# Patient Record
Sex: FEMALE | Race: WHITE | Employment: STUDENT | URBAN - METROPOLITAN AREA
[De-identification: names, ages, dates, MRNs, and addresses within clinical notes are randomized per-mention and may not be internally consistent; named-entity substitution may affect disease eponyms.]

---

## 2019-03-12 ENCOUNTER — OFFICE VISIT (OUTPATIENT)
Dept: ORTHOPEDICS | Facility: CLINIC | Age: 24
End: 2019-03-12
Payer: COMMERCIAL

## 2019-03-12 VITALS
SYSTOLIC BLOOD PRESSURE: 102 MMHG | HEIGHT: 71 IN | BODY MASS INDEX: 27.3 KG/M2 | WEIGHT: 195 LBS | DIASTOLIC BLOOD PRESSURE: 64 MMHG

## 2019-03-12 DIAGNOSIS — S06.0X0A CONCUSSION W/O COMA, INITIAL ENCOUNTER: Primary | ICD-10-CM

## 2019-03-12 PROCEDURE — 99204 OFFICE O/P NEW MOD 45 MIN: CPT | Performed by: FAMILY MEDICINE

## 2019-03-12 ASSESSMENT — MIFFLIN-ST. JEOR: SCORE: 1735.64

## 2019-03-12 NOTE — LETTER
[facility] [address] [address]  Phone: [phone]  Fax: [phone]    03/12/19    Tiffany Solano  [facility] [place] [address]      To whom it may concern:     Tiffany has a concussion. I recommend she work only half days for at least one week. She will be reassessed in 1-2 weeks.    Sincerely,      Kranthi Bello MD

## 2019-03-12 NOTE — PROGRESS NOTES
SUBJECTIVE:  Tiffany Solano is a 23 year old female who is seen as self referral for  evaluation of a possible concussion that occurred 19, 2  weeks ago.   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    Since your injury, have you continued with your normal cognitive activity (text, computer, school):  Has been going to work, she takes 10 minuetes breaks every 2 hours    Got blue blocking glasses yesterday.     Concussion Symptom Assessment      Headache or Pressure In Head: 4 - moderate to severe  Upset Stomach or Throwing Up: 0 - none  Problems with Balance: 0 - none  Feeling Dizzy: 2 - mild to moderate  Sensitivity to Light: 4 - moderate to severe  Sensitivity to Noise: 4 - moderate to severe  Mood Changes: 2 - mild to moderate  Feeling sluggish, hazy, or foggy: 4 - moderate to severe  Trouble Concentrating, Lack of Focus: 3 - moderate  Motion Sickness: 0 - none  Vision Changes: 0 - none  Memory Problems: 0 - none  Feeling Confused: 0 - none  Neck Pain: 4 - moderate to severe  Trouble Sleepin - none  Total Number of Symptoms: 8  Symptom Severity Score: 27      Sleep:  Sleeping a little more than usual      Past pertinent history: Migraines: no     Depression: no     Anxiety: no     Learning disability: no     ADHD: no     Past History of concussions: No      Patient's past medical, surgical, social and family histories reviewed:  reviewed on the up to date problem list in the chart      REVIEW OF SYSTEMS:  Skin: no bruising, no swelling  Musculoskeletal: as above  Neurologic: no numbness, paresthesias  Remainder of review of systems is negative including constitutional, CV,  "pulmonary, GI, except as noted in HPI or medical history.    OBJECTIVE:  /64   Ht 1.803 m (5' 11\")   Wt 88.5 kg (195 lb)   BMI 27.20 kg/m      EXAM:  General: healthy, alert and in no distress    Head: Normocephalic, atraumatic  Eyes: no scleral icterus or conjunctival erythema   Oropharynx:  Mucous membranes moist  Skin: no erythema, ecchymosis, petechiae, or jaundice  CV: regular rhythm by palpation, 2+ distal pulses, no pedal edema    Resp: normal respiratory effort without conversational dyspnea   Psych: normal mood and affect    Gait: Non-antalgic, appropriate coordination and balance   Neuro: normal light touch sensory exam of the extremities. Motor strength as noted below    HEENT:  Tympanic Membranes:Pearly  External Ear Canal:Normal  Oropharynx:Atraumatic  Reflexes: Normal  NECK: Full range of motion, mild soft tissue tenderness in the posterior lateral regions bilaterally and upper bilateral traps as well.  No midline tenderness.    NEUROLOGIC:  Cranial Nerves 2-12:  intact  RHINA:Yes  EOMI:Yes  Nystagmus: No  Coordination:  Finger to Nose: normal    Heel to Shin: normal    Rapid Alternating Movements: normal  Balance Testing: Romberg: normal   Backward Tandem: normal   Single-leg stance: normal  Advanced Balance Testing:     Single leg Balance with simultaneous cognitive test : normal    GAIT: Walk in hallway at normal speed: Able   Walk in hallway and turn head side to side when asked: Able with increase in symptoms headache and dizziness    Painful Eye movements: No  Convergence Testing: Abnormal   Visual Field Testing: normal        Vestibular/Ocular Motor Test:     Not Tested Headache Dizziness Nausea Fogginess Comments   Baseline N/A 6 0 0 0    Smooth Pursuits N/A 8 0 0 0    Saccades-Horizontal N/A 8 0 0 0    Saccades-Vertical N/A 8 0 0 0    Convergence (Near Point) N/A 8 0 0 0 (Near Point in CM)  Measure 1: 10  Measure 2: 12  Measure 3 13   VOR Vertical N/A 8 2 0 0    VOR Horizontal N/A 8 2 " 0 0    Visual Motion Sensitivity Test N/A 8 2 0 0            Cognitive:  Immediate object recall: 4/4  4 Object Recall at 5 minutes:4/4  Reverse months of the year:   Spell world backwards: Able  Backwards number strin numbers   4-9-3                  Alternate:  6-2-9   3-8-1-4   3-2-7-9    6-2-9-7-1   1-5-2-8-6    7-1-8-4-6-2   5-3-9-1-4-8       Impact Testing Scores: ImPACT Testing not performed    Strength:  Shoulder shrug (C5):5/5  Deltoid (C5): 5/5  Bicep (C6):5/5  Wrist Extension (C6): 5/5  Tricep (C7):5/5  Wrist Flexion (C7): 5/5  Finger Flexion (C8/T1):5/5      ASSESSMENT:  Concussion w/o coma, initial encounter    Primarily ocular symptoms, persistent 2 weeks out from initial injury.    immediate and delayed symptoms consistent with concussion.  There are no concerning neurologic/red flag findings on today's evaluation.    Discussed our current understanding of concussion, including etiology, prognosis, risk of re-injury, and possible complications, as well as typical management for this condition.    Counseled on importance of rest from physical and cognitive activities until asymptomatic, followed by graduated return to activity with close monitoring for recurrence of symptoms.  Discussed in depth what he should avoid, as well as worrisome signs, symptoms, and reasons to go to the ED.          PLAN:  Referral to physical therapy for vestibular ocular therapy.    Referral to chiropractic for concussion related neck pain and also acute low back pain from collision.    Absolute prohibition on returning to hockey until a symptomatic.  Strict avoidance of any activities raising risk for repeat head injury.  Discussed symptom minimization but okay for light activity and moderate work levels.  Will reduce work to half time for at least a week and possibly 2 depending on progression.  Advised to avoid screens as much as possible.    Recheck 1-2 weeks based on symptom progression.    Time spent in  one-on-one evaluation and discussion with patient regarding nature of problem, course, prior treatments, and therapeutic options, at least 50% of which was spent in counseling and coordination of care:  45 minutes.

## 2019-03-12 NOTE — LETTER
3/12/2019         RE: Tiffany Solano  [address]  [place] [address]        Dear Colleague,    Thank you for referring your patient, Tiffany Solano, to the [facility]. Please see a copy of my visit note below.      SUBJECTIVE:  Tiffany Solano is a 23 year old female who is seen as self referral for  evaluation of a possible concussion that occurred 19, 2  weeks ago.   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    Since your injury, have you continued with your normal cognitive activity (text, computer, school):  Has been going to work, she takes 10 minuetes breaks every 2 hours    Got blue blocking glasses yesterday.     Concussion Symptom Assessment      Headache or Pressure In Head: 4 - moderate to severe  Upset Stomach or Throwing Up: 0 - none  Problems with Balance: 0 - none  Feeling Dizzy: 2 - mild to moderate  Sensitivity to Light: 4 - moderate to severe  Sensitivity to Noise: 4 - moderate to severe  Mood Changes: 2 - mild to moderate  Feeling sluggish, hazy, or foggy: 4 - moderate to severe  Trouble Concentrating, Lack of Focus: 3 - moderate  Motion Sickness: 0 - none  Vision Changes: 0 - none  Memory Problems: 0 - none  Feeling Confused: 0 - none  Neck Pain: 4 - moderate to severe  Trouble Sleepin - none  Total Number of Symptoms: 8  Symptom Severity Score: 27      Sleep:  Sleeping a little more than usual      Past pertinent history: Migraines: no     Depression: no     Anxiety: no     Learning disability: no     ADHD: no     Past History of concussions: No      Patient's past medical, surgical, social and family histories  "reviewed:  reviewed on the up to date problem list in the chart      REVIEW OF SYSTEMS:  Skin: no bruising, no swelling  Musculoskeletal: as above  Neurologic: no numbness, paresthesias  Remainder of review of systems is negative including constitutional, CV, pulmonary, GI, except as noted in HPI or medical history.    OBJECTIVE:  /64   Ht 1.803 m (5' 11\")   Wt 88.5 kg (195 lb)   BMI 27.20 kg/m       EXAM:  General: healthy, alert and in no distress    Head: Normocephalic, atraumatic  Eyes: no scleral icterus or conjunctival erythema   Oropharynx:  Mucous membranes moist  Skin: no erythema, ecchymosis, petechiae, or jaundice  CV: regular rhythm by palpation, 2+ distal pulses, no pedal edema    Resp: normal respiratory effort without conversational dyspnea   Psych: normal mood and affect    Gait: Non-antalgic, appropriate coordination and balance   Neuro: normal light touch sensory exam of the extremities. Motor strength as noted below    HEENT:  Tympanic Membranes:Pearly  External Ear Canal:Normal  Oropharynx:Atraumatic  Reflexes: Normal  NECK: Full range of motion, mild soft tissue tenderness in the posterior lateral regions bilaterally and upper bilateral traps as well.  No midline tenderness.    NEUROLOGIC:  Cranial Nerves 2-12:  intact  RHINA:Yes  EOMI:Yes  Nystagmus: No  Coordination:  Finger to Nose: normal    Heel to Shin: normal    Rapid Alternating Movements: normal  Balance Testing: Romberg: normal   Backward Tandem: normal   Single-leg stance: normal  Advanced Balance Testing:     Single leg Balance with simultaneous cognitive test : normal    GAIT: Walk in hallway at normal speed: Able   Walk in hallway and turn head side to side when asked: Able with increase in symptoms headache and dizziness    Painful Eye movements: No  Convergence Testing: Abnormal   Visual Field Testing: normal        Vestibular/Ocular Motor Test:     Not Tested Headache Dizziness Nausea Fogginess Comments   Baseline N/A 6 " 0 0 0    Smooth Pursuits N/A 8 0 0 0    Saccades-Horizontal N/A 8 0 0 0    Saccades-Vertical N/A 8 0 0 0    Convergence (Near Point) N/A 8 0 0 0 (Near Point in CM)  Measure 1: 10  Measure 2: 12  Measure 3 13   VOR Vertical N/A 8 2 0 0    VOR Horizontal N/A 8 2 0 0    Visual Motion Sensitivity Test N/A 8 2 0 0            Cognitive:  Immediate object recall:   4 Object Recall at 5 minutes:  Reverse months of the year:   Spell world backwards: Able  Backwards number strin numbers   4-9-3                  Alternate:  6-2-9   3-8-1-4   3-2-7-9    6-2-9-7-1   1-5-2-8-6    7-1-8-4-6-2   5-3-9-1-4-8       Impact Testing Scores: ImPACT Testing not performed    Strength:  Shoulder shrug (C5):5/5  Deltoid (C5): 5/5  Bicep (C6):5/5  Wrist Extension (C6): 5/5  Tricep (C7):5/5  Wrist Flexion (C7): 5/5  Finger Flexion (C8/T1):5/5      ASSESSMENT:  Concussion w/o coma, initial encounter    Primarily ocular symptoms, persistent 2 weeks out from initial injury.    immediate and delayed symptoms consistent with concussion.  There are no concerning neurologic/red flag findings on today's evaluation.    Discussed our current understanding of concussion, including etiology, prognosis, risk of re-injury, and possible complications, as well as typical management for this condition.    Counseled on importance of rest from physical and cognitive activities until asymptomatic, followed by graduated return to activity with close monitoring for recurrence of symptoms.  Discussed in depth what he should avoid, as well as worrisome signs, symptoms, and reasons to go to the ED.          PLAN:  Referral to physical therapy for vestibular ocular therapy.    Referral to chiropractic for concussion related neck pain and also acute low back pain from collision.    Absolute prohibition on returning to hockey until a symptomatic.  Strict avoidance of any activities raising risk for repeat head injury.  Discussed symptom minimization but  okay for light activity and moderate work levels.  Will reduce work to half time for at least a week and possibly 2 depending on progression.  Advised to avoid screens as much as possible.    Recheck 1-2 weeks based on symptom progression.    Time spent in one-on-one evaluation and discussion with patient regarding nature of problem, course, prior treatments, and therapeutic options, at least 50% of which was spent in counseling and coordination of care:  45 minutes.    Again, thank you for allowing me to participate in the care of your patient.        Sincerely,        Kranthi Bello MD

## 2019-03-12 NOTE — Clinical Note
Still willing and interested in seeing cervicalgia in concussions? This young lady also has lbp from direct blow at the same time... details… detailed exam

## 2019-03-21 ENCOUNTER — HOSPITAL ENCOUNTER (OUTPATIENT)
Dept: PHYSICAL THERAPY | Facility: CLINIC | Age: 24
Setting detail: THERAPIES SERIES
End: 2019-03-21
Attending: FAMILY MEDICINE
Payer: COMMERCIAL

## 2019-03-21 PROCEDURE — 97112 NEUROMUSCULAR REEDUCATION: CPT | Mod: GP | Performed by: PHYSICAL THERAPIST

## 2019-03-21 PROCEDURE — 97162 PT EVAL MOD COMPLEX 30 MIN: CPT | Mod: GP | Performed by: PHYSICAL THERAPIST

## 2019-03-21 NOTE — PROGRESS NOTES
03/21/19 0800   Quick Adds   Type of Visit Initial OP PT Evaluation   General Information   Start of Care Date 03/21/19   Referring Physician Dr. Kranthi Bello   Orders Evaluate and Treat as Indicated   Order Date 03/12/19   Medical Diagnosis Concussion without coma   Onset of illness/injury or Date of Surgery 02/24/19   Surgical/Medical history reviewed Yes   Pertinent history of current problem (include personal factors and/or comorbidities that impact the POC) 23 y.o sustained a concussion on 2/24/19 hitting head against boards while playing hockey. Was wearing a helmet. HA hurts right when she wakes up and then right before bed. Less noticing HA throughout the day.  Still feels hazy. LIght sensitivity is improving. Currently work 1/2 days and symptoms are improving. Trying to stay off screens. Family hx of migraines. Did try short slow biking and was fine. Jumping jacks increased pain. At work takes 10 min breaks every 2 hours, Bought blue blocking glasses, CSA at 22 on 3/12/19. Being seen by chiro for neck and back pain.    Prior level of function comment Plays hockey 3x/week. Independent with all functional mobility. Works full time.   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    Patient/Family Goals Statement improve concussion symptoms.    Functional Scales   Functional Scales and Outcomes CSA 9   Pain   Pain comments Neck pain 5/10.  HA 5/10   Cognitive Status Examination   Orientation orientation to person, place and time   Level of Consciousness alert   Follows Commands and Answers Questions 100% of the time   Range of Motion (ROM)   [facility]   Strength   Strength Comments Referral to chiro to assess neck and back   Gait   Gait Comments No gross deficits with gait noted   Balance   Balance Comments TAndem stand able to do EO and EC  for 30 seconds, SLS EO x 30 seconds.  SLS EC 4-6 seconds   Oculomotor Exam   Smooth Pursuit Normal   Smooth Pursuit Comment c/o dizziness   Saccades Normal   Saccades Comments c/o dizziness   VOR Normal   VOR Comments c/o dizziness and HA   VOR Cancellation Abnormal   VOR Cancellation Comments difficulty staying on target, increased HA and dizziness   Convergence Testing Abnormal   Convergence Testing Comments 6 cm, 7cm, 7cm, 7 1/2 cm, 7 1/2 cm   Dynamic Visual Acuity (DVA)   Static Acuity (LogMar) 0.1   Horizontal Head Movement at 1 Hz (LogMar) 0.1   Horizontal Head Movement at 2 Hz (LogMar) 0.5   DVA Comments dizzy at slow and fast speeds but more pronounced at fast speeds.    Planned Therapy Interventions   Planned Therapy Interventions balance training;neuromuscular re-education   Clinical Impression   Criteria for Skilled Therapeutic Interventions Met yes, treatment indicated   PT Diagnosis Concussion, Dizziness,    Influenced by the following impairments dizziness, motion intolerance   Functional limitations due to impairments unable to work without breaks, unable to exercise at [employer] playing hockey,    Clinical Presentation Evolving/Changing   Clinical Presentation Rationale variable dizziness, CSA, DVA, abnormal convergence, HA and neck pain   Clinical Decision Making (Complexity) Moderate complexity   Therapy Frequency 1 time/week   Predicted Duration of Therapy Intervention (days/wks) 4 weeks   Risk & Benefits of therapy have been explained Yes   Patient, Family & other staff in agreement with plan of care Yes   GOALS   PT Eval Goals 1;2;3   Goal 1   Goal Identifier CSA   Goal Description Client will score a 0 on the CSA to indicate improvment in concussion symptoms.    Target Date 04/19/19   Goal 2   Goal Identifier DVA 2 Hz speed   Goal Description Client will score within 3 lines or less on DVA at 2 Hz speed to indicate improved symtpoms with quick head movements   Target Date 04/19/19   Goal 3   Goal  Identifier RTP protocol    Goal Description Client will be able to tolerate at least a level 3 RTP protocol without increase in symptoms.    Target Date 04/19/19   Total Evaluation Time   PT Eval, Moderate Complexity Minutes (91750) 30        03/21/19 0800   Quick Adds   Type of Visit Initial OP PT Evaluation   General Information   Start of Care Date 03/21/19   Referring Physician Dr. Kranthi Bello   Orders Evaluate and Treat as Indicated   Order Date 03/12/19   Medical Diagnosis Concussion without coma   Onset of illness/injury or Date of Surgery 02/24/19   Surgical/Medical history reviewed Yes   Pertinent history of current problem (include personal factors and/or comorbidities that impact the POC) 23 y.o sustained a concussion on 2/24/19 hitting head against boards while playing hockey. Was wearing a helmet. HA hurts right when she wakes up and then right before bed. Less noticing HA throughout the day.  Still feels hazy. LIght sensitivity is improving. Currently work 1/2 days and symptoms are improving. Trying to stay off screens. Family hx of migraines. Did try short slow biking and was fine. Jumping jacks increased pain. At work takes 10 min breaks every 2 hours, Bought blue blocking glasses, CSA at 22 on 3/12/19. Being seen by chiro for neck and back pain.    Prior level of function comment Plays hockey 3x/week. Independent with all functional mobility. Works full time.   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    Patient/Family Goals Statement improve concussion symptoms.    Functional Scales   Functional Scales and Outcomes CSA 9   Pain   Pain comments Neck pain 5/10.  HA 5/10   Cognitive Status Examination   Orientation orientation to person, place and time   Level of Consciousness alert   Follows Commands and Answers Questions 100% of the time   Range of  Motion (ROM)   ROM Comment [facility] for testing   Strength   Strength Comments Referral to chiro to assess neck and back   Gait   Gait Comments No gross deficits with gait noted   Balance   Balance Comments TAndem stand able to do EO and EC for 30 seconds, SLS EO x 30 seconds.  SLS EC 4-6 seconds   Oculomotor Exam   Smooth Pursuit Normal   Smooth Pursuit Comment c/o dizziness   Saccades Normal   Saccades Comments c/o dizziness   VOR Normal   VOR Comments c/o dizziness and HA   VOR Cancellation Abnormal   VOR Cancellation Comments difficulty staying on target, increased HA and dizziness   Convergence Testing Abnormal   Convergence Testing Comments 6 cm, 7cm, 7cm, 7 1/2 cm, 7 1/2 cm   Dynamic Visual Acuity (DVA)   Static Acuity (LogMar) 0.1   Horizontal Head Movement at 1 Hz (LogMar) 0.1   Horizontal Head Movement at 2 Hz (LogMar) 0.5   DVA Comments dizzy at slow and fast speeds but more pronounced at fast speeds.    Planned Therapy Interventions   Planned Therapy Interventions balance training;neuromuscular re-education   Clinical Impression   Criteria for Skilled Therapeutic Interventions Met yes, treatment indicated   PT Diagnosis Concussion, Dizziness,    Influenced by the following impairments dizziness, motion intolerance   Functional limitations due to impairments unable to work without breaks, unable to exercise at [employer] playing hockey,    Clinical Presentation Evolving/Changing   Clinical Presentation Rationale variable dizziness, CSA, DVA, abnormal convergence, HA and neck pain   Clinical Decision Making (Complexity) Moderate complexity   Therapy Frequency 1 time/week   Predicted Duration of Therapy Intervention (days/wks) 4 weeks   Risk & Benefits of therapy have been explained Yes   Patient, Family & other staff in agreement with plan of care Yes   GOALS   PT Eval Goals 1;2;3   Goal 1   Goal Identifier CSA   Goal Description Client will score a 0 on the CSA to indicate improvment in concussion symptoms.     Target Date 04/19/19   Goal 2   Goal Identifier DVA 2 Hz speed   Goal Description Client will score within 3 lines or less on DVA at 2 Hz speed to indicate improved symtpoms with quick head movements   Target Date 04/19/19   Goal 3   Goal Identifier RTP protocol    Goal Description Client will be able to tolerate at least a level 3 RTP protocol without increase in symptoms.    Target Date 04/19/19   Total Evaluation Time   PT Eval, Moderate Complexity Minutes (07308) 30

## 2019-03-27 ENCOUNTER — THERAPY VISIT (OUTPATIENT)
Dept: CHIROPRACTIC MEDICINE | Facility: CLINIC | Age: 24
End: 2019-03-27
Attending: FAMILY MEDICINE
Payer: COMMERCIAL

## 2019-03-27 DIAGNOSIS — M54.50 ACUTE RIGHT-SIDED LOW BACK PAIN WITHOUT SCIATICA: ICD-10-CM

## 2019-03-27 DIAGNOSIS — M54.2 CERVICALGIA: ICD-10-CM

## 2019-03-27 DIAGNOSIS — M99.01 CERVICAL SEGMENT DYSFUNCTION: Primary | ICD-10-CM

## 2019-03-27 DIAGNOSIS — M40.00 KYPHOSIS (ACQUIRED) (POSTURAL): ICD-10-CM

## 2019-03-27 DIAGNOSIS — S06.0X0D CONCUSSION WITHOUT LOSS OF CONSCIOUSNESS, SUBSEQUENT ENCOUNTER: ICD-10-CM

## 2019-03-27 DIAGNOSIS — M99.03 SOMATIC DYSFUNCTION OF LUMBAR REGION: ICD-10-CM

## 2019-03-27 DIAGNOSIS — M99.04 SOMATIC DYSFUNCTION OF SACRAL REGION: ICD-10-CM

## 2019-03-27 PROCEDURE — 99203 OFFICE O/P NEW LOW 30 MIN: CPT | Mod: 25 | Performed by: CHIROPRACTOR

## 2019-03-27 PROCEDURE — 98941 CHIROPRACT MANJ 3-4 REGIONS: CPT | Mod: AT | Performed by: CHIROPRACTOR

## 2019-03-31 PROBLEM — M54.50 ACUTE RIGHT-SIDED LOW BACK PAIN WITHOUT SCIATICA: Status: ACTIVE | Noted: 2019-03-31

## 2019-03-31 PROBLEM — M99.04 SOMATIC DYSFUNCTION OF SACRAL REGION: Status: ACTIVE | Noted: 2019-03-31

## 2019-03-31 PROBLEM — M54.2 CERVICALGIA: Status: ACTIVE | Noted: 2019-03-31

## 2019-03-31 PROBLEM — M99.03 SOMATIC DYSFUNCTION OF LUMBAR REGION: Status: ACTIVE | Noted: 2019-03-31

## 2019-03-31 PROBLEM — M40.00 KYPHOSIS (ACQUIRED) (POSTURAL): Status: ACTIVE | Noted: 2019-03-31

## 2019-03-31 PROBLEM — M99.01 CERVICAL SEGMENT DYSFUNCTION: Status: ACTIVE | Noted: 2019-03-31

## 2019-03-31 PROBLEM — S06.0X0D CONCUSSION WITHOUT LOSS OF CONSCIOUSNESS, SUBSEQUENT ENCOUNTER: Status: ACTIVE | Noted: 2019-03-31

## 2019-04-01 ENCOUNTER — HOSPITAL ENCOUNTER (OUTPATIENT)
Dept: PHYSICAL THERAPY | Facility: CLINIC | Age: 24
Setting detail: THERAPIES SERIES
End: 2019-04-01
Attending: FAMILY MEDICINE
Payer: COMMERCIAL

## 2019-04-01 PROCEDURE — 97112 NEUROMUSCULAR REEDUCATION: CPT | Mod: GP | Performed by: PHYSICAL THERAPIST

## 2019-04-01 NOTE — PROGRESS NOTES
Chiropractic Clinic Visit    PCP: [facility], [facility]    Tiffany Solano is a 23 year old female who is seen  in consultation at the request of  Kranthi Bello M.D. presenting with acute neck pain, upper back and HA sx. She also reports low back pain. Patient reports that the onset was February 24th 2019.  When asked, patient denies:, falling, slipping, bending and reaching or sleeping awkwardly. The pt reports an acute concussion from an injury playing hockey. She states she tripped into the boards hitting the back of her head in the boards. She was wearing a jim at the time. The pt did not lose consciousness. She grades the px a 6-8/10 on VAS. The pt is currently light sensitive, has constant HA and notes pain in the low back as well. She denies dizziness. The pt denies weakness in the extremities or other unusual sx.  Prior to onset, the patient was able to sit for 8 hours. Patient notes that due to symptoms, they can only sit for a few hours prior to pain. Tiffany Solano notes   sleeping rated at a 8/10 painful, difficult and prior to this incident it was 0/10.    Injury: Concussion from playing hockey      Location of Pain: right cervical and R low back  at the following level(s) C2 , C7 , T1 , T5 , L5 , Sacrum  and PSIS Right   Duration of Pain: February 24th/2019   Rating of Pain at worst: 8/10  Rating of Pain Currently: 6/10  Symptoms are better with: PT  Symptoms are worse with: sitting and sleeping, falling asleep  Additional Features: none      Other evaluation and/or treatments so far consists of: Sports medicine, PT    Health History  as reported by the patient:    How does the patient rate their own health:   Good    Current or past medical history:   Asthma    Medical allergies  None    Past Traumas/Surgeries  Other:  Bunionectomy     Family History  History reviewed. No pertinent family history.    Medications:  None    Occupation:       Primary job tasks:    Computer work and Prolonged standing    Barriers as home/work:   none    Additional health Issues:     none              Review of Systems  Musculoskeletal: as above  Remainder of review of systems is negative including constitutional, CV, pulmonary, GI, Skin and Neurologic except as noted in HPI or medical history.    History reviewed. No pertinent past medical history.  History reviewed. No pertinent surgical history.    Objective  There were no vitals taken for this visit.    GENERAL APPEARANCE: healthy, alert and no distress   GAIT: NORMAL  SKIN: no suspicious lesions or rashes  NEURO: Normal strength and tone, mentation intact and speech normal  PSYCH:  mentation appears normal and affect normal/bright      Tiffany was asked to complete the Neck Disability Index, the Oswestry Low Back Disability Index and Maria Esther Start Back screening tool, today in the office. NDI Disability score: 16%; pain severity scale: 8/10., The Oswestry Disability score: 12%. Keel Start Total Score:3 Sub Score: 1       Cervical Spine Exam    Range of Motion:         Full active and passive ROM forward flexion,  decreased extension, lateral rotation, lateral flexion with pain at end range     Inspection:         No visible deformity        normal lordotic curvature maintained  Anterior neck carriage, slumped seated posture, poor posture, increased kyphosis      Tender:        upper border of trapezius       B suboccipitals, B levator scapula     Non-Tender:        remainder of cervical spine area    Muscle strength:       C4 (shoulder shrug)  symmetric 5/5 Normal       C5 (shoulder abduction) symmetric 5/5 Normal       C6 (elbow flexion) symmetric 5/5 Normal       C7 (elbow extension) symmetric 5/5 Normal       C8 (finger abduction, thumb flexion) symmetric 5/5 Normal    Reflexes:        C5 (biceps) symmetric 2 bilaterally       C6 (supinator) symmetric 2 bilaterally       C7 (triceps) symmetric 2 bilaterally      Sensation:       grossly  "intact througout bilateral upper extremities    Special Tests:       positive (+) Spurling  Jad's- positive, VBI- negative and [place] - negative    Lymphatics:        no edema noted in the upper extremities       Lumbar exam:    Inspection:  \"     no visible deformity in the low back       normal skin\"  Slumped seated posture, anterior pelvic flexion with sacral/coccyx seated posture, Increased thoracic kyphosis with subsequent anterior cervical carriage. Poor seated posture      ROM:       full flexion       limited extension due to pain    Tender:       paraspinal muscles       B QL    Non Tender:       remainder of lumbar spine    Strength:       hip flexion 5/5 Normal       knee extension 5/5 Normal       ankle dorsiflexion 5/5 Normal       ankle plantarflexion 5/5 Normal       dorsiflexion of the great toe 5/5 Normal    Reflexes:       patellar (L3, L4) 2 bilaterally       achilles tendons (S1) 2 bilaterally        Sensation:      grossly intact throughout lower extremities    Special tests:  Kemps - Right positive, low back pain and Left negative, SLR - Right positive low back pain and Left negative, Gaenslen's - Right negative and Left negative and Fabere - Right positive, hip and low back pain and Left negative    Segmental spinal dysfunction/restrictions found at:  Occiput, C2 , C7 , T5 , T9 , L5 , Sacrum  and PSIS Left     The following soft tissue hypotonicities were observed:Quad lumb: bilateral, referred pain: no  Lev scapulae: ache and dull pain, referred pain: no  Sub-occiput: ache and dull pain, referred pain: no    Trigger points were found in:none     Muscle spasm found in:Lumbar erector spine, Quad lumb and Sub-occipital      Radiology:  None     Assessment:    1. Cervical segment dysfunction    2. Cervicalgia    3. Somatic dysfunction of lumbar region    4. Acute right-sided low back pain without sciatica    5. Somatic dysfunction of sacral region    6. Concussion without loss of " consciousness, subsequent encounter    7. Kyphosis (acquired) (postural)        RX ordered/plan of care  Anticipated outcomes  Possible risks and side effects    After discussing the risk and benefits of care, patient consented to treatment    Prognosis: Excellent      Patient's condition:  Patient had restrictions pre-manipulation    Treatment effectiveness:  Post manipulation there is better intersegmental movement and Patient claims to feel looser post manipulation      Plan:    Procedures:  Evaluation and Management  08083 Moderate level exam 30 min    CMT:  92619 Chiropractic manipulative treatment 3-4 regions performed   Cervical: Diversified and Activator, Occiput, C1 , C7 , Prone, Supine  Thoracic: Diversified, T1, T7, T9, Prone  Lumbar: Diversified, L5, Side posture  Pelvis: Drop Table, Sacrum , PSIS Right , Prone, Side posture    Modalities:  None performed this visit    Therapeutic procedures:  None    Response to Treatment  Reduction in symptoms as reported by patient    Treatment plan and goals:  Goals:  SLEEPING: the patient specific goal is to attain his pre-injury status of 8 hours comfortably-falling asleep  SITTING: the patient specific goal is to attain pre-injury status of  8 hours comfortably    Frequency of care  Duration of care is estimated to be 6-8 weeks, from the initial treatment.  It is estimated that the patient will need a total of 6-8 visits to resolve this episode.  For the initial therapeutic trial of care, the frequency is recommended at 1 X week, once daily.  A reevaluation would be clinically appropriate in 6-8 visits, to determine progress and further course of care.    In-Office Treatment  Evaluation  Spinal Chiropractic Manipulative Therapy  Acupuncture              Recommendations:    Instructions:  use lumbar support     Follow-up:    Return to care in 1 week with ACP  Postural correction.       Disclaimer: This note consists of symbols derived from keyboarding, dictation  and/or voice recognition software. As a result, there may be errors in the script that have gone undetected. Please consider this when interpreting information found in this chart.

## 2019-04-03 ENCOUNTER — THERAPY VISIT (OUTPATIENT)
Dept: CHIROPRACTIC MEDICINE | Facility: CLINIC | Age: 24
End: 2019-04-03
Payer: COMMERCIAL

## 2019-04-03 DIAGNOSIS — M99.03 SOMATIC DYSFUNCTION OF LUMBAR REGION: ICD-10-CM

## 2019-04-03 DIAGNOSIS — M54.50 ACUTE RIGHT-SIDED LOW BACK PAIN WITHOUT SCIATICA: ICD-10-CM

## 2019-04-03 DIAGNOSIS — S06.0X0D CONCUSSION WITHOUT LOSS OF CONSCIOUSNESS, SUBSEQUENT ENCOUNTER: ICD-10-CM

## 2019-04-03 DIAGNOSIS — M54.2 CERVICALGIA: ICD-10-CM

## 2019-04-03 DIAGNOSIS — M99.04 SOMATIC DYSFUNCTION OF SACRAL REGION: ICD-10-CM

## 2019-04-03 DIAGNOSIS — M99.01 CERVICAL SEGMENT DYSFUNCTION: Primary | ICD-10-CM

## 2019-04-03 PROCEDURE — 97035 APP MDLTY 1+ULTRASOUND EA 15: CPT | Performed by: CHIROPRACTOR

## 2019-04-03 PROCEDURE — 97112 NEUROMUSCULAR REEDUCATION: CPT | Mod: 59 | Performed by: CHIROPRACTOR

## 2019-04-03 PROCEDURE — 98941 CHIROPRACT MANJ 3-4 REGIONS: CPT | Mod: AT | Performed by: CHIROPRACTOR

## 2019-04-04 NOTE — PROGRESS NOTES
Visit #:  2    Subjective:  Tiffany Solano is a 23 year old female who is seen in f/u up for:        Cervical segment dysfunction  Cervicalgia  Somatic dysfunction of lumbar region  Acute right-sided low back pain without sciatica  Somatic dysfunction of sacral region  Concussion without loss of consciousness, subsequent encounter.     Since last visit on 3/27/2019,  Tiffany Solano reports:    Area of chief complaint:  Cervical, Thoracic and Lumbar :  Symptoms are graded at 4/10. The quality is described as stiff, achey, dull.  Motion has increased, but is still not normal. The pt reports 30% to 40% improvement since the initial presentation. She states HA sx have reduced and neck pain has decreased. She reports R sided neck pain and reduced ROM in the neck area. She notes R sided flank pain today. She denies weakness in the extremities or other unusual sx. Patient feels that they are improved due to a reduction in symptoms.     Since last visit the patient feels that they are 30-40% percent  improved from last visit.       Objective:  The following was observed:    P: palpatory tenderness Lumbar erector spine, Quad lumb and Sub-occipital R>>L  A: static palpation demonstrates intersegmental asymmetry   R: motion palpation notes restricted motion  T: hypertonicity at: Levator scapulae and Sub-occipital R>>L    Segmental spinal dysfunction/restrictions found at:  C2 , C7 , T5 , T9 , L5 , Sacrum  and PSIS Right       Assessment:    Diagnoses:      1. Cervical segment dysfunction    2. Cervicalgia    3. Somatic dysfunction of lumbar region    4. Acute right-sided low back pain without sciatica    5. Somatic dysfunction of sacral region    6. Concussion without loss of consciousness, subsequent encounter        Patient's condition:  Patient had restrictions pre-manipulation    Treatment effectiveness:  Post manipulation there is better intersegmental movement and Patient claims to feel looser post  manipulation      Procedures:  CMT:  59793 Chiropractic manipulative treatment 3-4 regions performed   OCC: R OCC Activator diversified  Thoracic: Diversified, T1, T5, T9, Prone  Lumbar: Diversified, L5, Side posture  Pelvis: Drop Table, Sacrum , PSIS Right , Prone    Modalities:  47324: US:  1.7 Torres/cm squared for 8  minutes at 1 mhz   Location: R levator scapula     Therapeutic procedures:  08022: Neurological re-education/proprioception training and proper long term sitting posture: Corrected patient's seated posture when sitting for longer than 20 minutes or seated at the computer related to work duties for over 8 hours per day. Fit patient with Cheri lumbar support for postural re-training with demonstration. Showed patient how to place the support correctly when seated and to increase usage by 2-3 hour increments per day until they are able to sit full time without spinal irritation. Related improper vs. proper sitting to optimal spinal biomechanics using the spine model with demonstration with the purpose of PREVENTING premature spinal degeneration from cumulative static motion. Demonstrated the increase in load and shearing forces on the spine in addition to the cumulative degenerative effects of axial compression on a spine that is chronically slumped and in an 'unlocked' position vs a 'locked' position. Demonstrated the use of a lap top table for lap top computers to prevent excessive cervical flexion of the neck. Demonstrated 'hip hinging' to access the computer when seated rather than thoracic flexion. Gave cervical retraction for proper cervical alignment, anterior deep cervical flexor strengthening and cervical proprioception training with demonstration. Per 10-12 minutes total        Response to Treatment  Reduction in symptoms as reported by patient    Prognosis: Excellent    Progress towards Goals: Patient is making progress towards the goal.  Goals:  SLEEPING: the patient specific goal is to  attain his pre-injury status of 8 hours comfortably-falling asleep  SITTING: the patient specific goal is to attain pre-injury status of  8 hours comfortably         Recommendations:    Instructions:  use lumbar support when seated     Follow-up:    Return to care in 1 week with US  ACP to follow .

## 2019-04-08 ENCOUNTER — HOSPITAL ENCOUNTER (OUTPATIENT)
Dept: PHYSICAL THERAPY | Facility: CLINIC | Age: 24
Setting detail: THERAPIES SERIES
End: 2019-04-08
Attending: FAMILY MEDICINE
Payer: COMMERCIAL

## 2019-04-08 PROCEDURE — 97112 NEUROMUSCULAR REEDUCATION: CPT | Mod: GP | Performed by: PHYSICAL THERAPIST

## 2019-04-12 ENCOUNTER — THERAPY VISIT (OUTPATIENT)
Dept: CHIROPRACTIC MEDICINE | Facility: CLINIC | Age: 24
End: 2019-04-12
Payer: COMMERCIAL

## 2019-04-12 DIAGNOSIS — M54.2 CERVICALGIA: ICD-10-CM

## 2019-04-12 DIAGNOSIS — S06.0X0D CONCUSSION WITHOUT LOSS OF CONSCIOUSNESS, SUBSEQUENT ENCOUNTER: ICD-10-CM

## 2019-04-12 DIAGNOSIS — M99.01 CERVICAL SEGMENT DYSFUNCTION: Primary | ICD-10-CM

## 2019-04-12 DIAGNOSIS — M99.04 SOMATIC DYSFUNCTION OF SACRAL REGION: ICD-10-CM

## 2019-04-12 DIAGNOSIS — M99.03 SOMATIC DYSFUNCTION OF LUMBAR REGION: ICD-10-CM

## 2019-04-12 DIAGNOSIS — M54.50 ACUTE RIGHT-SIDED LOW BACK PAIN WITHOUT SCIATICA: ICD-10-CM

## 2019-04-12 PROCEDURE — 98941 CHIROPRACT MANJ 3-4 REGIONS: CPT | Mod: AT | Performed by: CHIROPRACTOR

## 2019-04-12 PROCEDURE — 97035 APP MDLTY 1+ULTRASOUND EA 15: CPT | Performed by: CHIROPRACTOR

## 2019-04-12 NOTE — PROGRESS NOTES
Visit #:  3    Subjective:  Tiffany Solano is a 23 year old female who is seen in f/u up for:        Cervical segment dysfunction  Cervicalgia  Somatic dysfunction of lumbar region  Acute right-sided low back pain without sciatica  Somatic dysfunction of sacral region  Concussion without loss of consciousness, subsequent encounter.     Since last visit,  Tiffany Solano reports:    Area of chief complaint:  Cervical, Thoracic and Lumbar :  Symptoms are graded at 3/10. The quality is described as stiff, achey, dull.  Motion has increased, but is still not normal. The pt reports 60% improvement overall. She is pleased with her progress. She notes tension on the R side of the low back and flank area. She notes tension on the R side of the neck. She denies sx of dizziness. She will have a HA in the morning then after work. She is using the lumbar support with good results. The pt denies weakness or other unusual sx.     Since last visit the patient feels that they are 60% percent  improved from last visit.       Objective:  The following was observed:    P: palpatory tenderness Lumbar erector spine, Quad lumb and Sub-occipital R>>L  A: static palpation demonstrates intersegmental asymmetry   R: motion palpation notes restricted motion  T: hypertonicity at: Levator scapulae and Sub-occipital R>>L    Segmental spinal dysfunction/restrictions found at:  C2 , C7 , T5 , T9 , L5 , Sacrum  and PSIS Right       Assessment:    Diagnoses:      1. Cervical segment dysfunction    2. Cervicalgia    3. Somatic dysfunction of lumbar region    4. Acute right-sided low back pain without sciatica    5. Somatic dysfunction of sacral region    6. Concussion without loss of consciousness, subsequent encounter        Patient's condition:  Patient responding well to therapy    Treatment effectiveness:  Post manipulation there is better intersegmental movement and Patient claims to feel looser post manipulation      Procedures:  CMT:  45681  Chiropractic manipulative treatment 3-4 regions performed   OCC: R OCC Activator diversified  Thoracic: Diversified, T1, T5, T9, Prone  Lumbar: Diversified, L5, Side posture  Pelvis: Drop Table, Sacrum , PSIS Right , Prone    Modalities:  21206: US:  1.9 Torres/cm squared for 8  minutes at 1 mhz   Location: R levator scapula     Therapeutic procedures:  None     Response to Treatment  Reduction in symptoms as reported by patient    Prognosis: Excellent    Progress towards Goals: Patient is making progress towards the goal.  Goals:  SLEEPING: the patient specific goal is to attain his pre-injury status of 8 hours comfortably-falling asleep  SITTING: the patient specific goal is to attain pre-injury status of  8 hours comfortably         Recommendations:    Instructions:  use lumbar support when seated     Follow-up:    Return to care in 1 week with ACP

## 2019-04-15 ENCOUNTER — HOSPITAL ENCOUNTER (OUTPATIENT)
Dept: PHYSICAL THERAPY | Facility: CLINIC | Age: 24
Setting detail: THERAPIES SERIES
End: 2019-04-15
Attending: FAMILY MEDICINE
Payer: COMMERCIAL

## 2019-04-15 PROCEDURE — 97112 NEUROMUSCULAR REEDUCATION: CPT | Mod: GP | Performed by: PHYSICAL THERAPIST

## 2019-05-07 ENCOUNTER — HOSPITAL ENCOUNTER (OUTPATIENT)
Dept: PHYSICAL THERAPY | Facility: CLINIC | Age: 24
Setting detail: THERAPIES SERIES
End: 2019-05-07
Attending: FAMILY MEDICINE
Payer: COMMERCIAL

## 2019-05-07 PROCEDURE — 97110 THERAPEUTIC EXERCISES: CPT | Mod: GP | Performed by: PHYSICAL THERAPIST

## 2019-05-07 NOTE — DISCHARGE SUMMARY
Outpatient Physical Therapy Discharge Note     Patient: Tiffany Solano  : 1995    Beginning/End Dates of Reporting Period:  3/21/19 to 2019    Referring Provider: Dr. Kranthi Bello    Therapy Diagnosis: Concussion, Dizziness     Client Self Report: Tiffany states she is doing well, notices her main issue is waking up with head aches occassionally. Will be playing in first hockey game this weekend    Objective Measurements:  Objective Measure: CSA  Details: START 9, END 2     Objective Measure: DVA  Details: START 0.1 static, 0.1 1 Hz, 0.5 2 Hz. END 0.4 Hz    Goals:  Goal Identifier CSA   Goal Description Client will score a 0 on the CSA to indicate improvment in concussion symptoms.    Target Date 19   Date Met      Progress:     Goal Identifier DVA 2 Hz speed   Goal Description MET: Client will score within 3 lines or less on DVA at 2 Hz speed to indicate improved symtpoms with quick head movements   Target Date 19   Date Met   19   Progress:     Goal Identifier RTP protocol    Goal Description MET: Client will be able to tolerate at least a level 3 RTP protocol without increase in symptoms.    Target Date 19   Date Met   19   Progress:         Progress Toward Goals:   Progress this reporting period: Tiffany has met 2/3 of her long term goals. She currently has not met her goal of achieving a 0 on the CSA. Her only compliant is headaches, which she is experiencing more first thing in the morning, which is likely associated with neck pain. Therapist discussed altering sleeping position or adding pillows to address headaches, and will continue with going to a chiropractor for her neck. She is performing all exercises well and has tolerated higher intensity exercise, indicating ability to return to prior level of activity.    Plan:  Discharge from therapy.    Discharge:    Reason for Discharge: No further expectation of progress.    Equipment Issued: None    Discharge Plan:  Patient to continue home program.

## 2019-05-15 ENCOUNTER — THERAPY VISIT (OUTPATIENT)
Dept: CHIROPRACTIC MEDICINE | Facility: CLINIC | Age: 24
End: 2019-05-15
Payer: COMMERCIAL

## 2019-05-15 DIAGNOSIS — S06.0X0D CONCUSSION WITHOUT LOSS OF CONSCIOUSNESS, SUBSEQUENT ENCOUNTER: ICD-10-CM

## 2019-05-15 DIAGNOSIS — M99.03 SOMATIC DYSFUNCTION OF LUMBAR REGION: ICD-10-CM

## 2019-05-15 DIAGNOSIS — M54.50 ACUTE RIGHT-SIDED LOW BACK PAIN WITHOUT SCIATICA: ICD-10-CM

## 2019-05-15 DIAGNOSIS — M54.2 CERVICALGIA: ICD-10-CM

## 2019-05-15 DIAGNOSIS — M99.04 SOMATIC DYSFUNCTION OF SACRAL REGION: ICD-10-CM

## 2019-05-15 DIAGNOSIS — M99.01 CERVICAL SEGMENT DYSFUNCTION: Primary | ICD-10-CM

## 2019-05-15 PROCEDURE — 97810 ACUP 1/> WO ESTIM 1ST 15 MIN: CPT | Mod: GA | Performed by: CHIROPRACTOR

## 2019-05-15 PROCEDURE — 98940 CHIROPRACT MANJ 1-2 REGIONS: CPT | Mod: AT | Performed by: CHIROPRACTOR

## 2019-05-15 NOTE — PROGRESS NOTES
Visit #:  4    Subjective:  Tiffany Solano is a 23 year old female who is seen in f/u up for:        Cervical segment dysfunction  Cervicalgia  Somatic dysfunction of lumbar region  Acute right-sided low back pain without sciatica  Somatic dysfunction of sacral region  Concussion without loss of consciousness, subsequent encounter.     Since last visit,  Tiffany Solano reports:    Area of chief complaint:  Cervical, Thoracic and Lumbar :  Symptoms are graded at 3/10. The quality is described as stiff, achey, dull.  Motion has increased, but is still not normal. The pt reports 80% improvement. She experiences HA sx only in the morning. She was able to return to hockey. She notes an ache on the R side of the neck. She is pleased  With her progress. The pt continues with PT with good results.      Since last visit the patient feels that they are 80% percent  improved from last visit.       Objective:  The following was observed:    P: palpatory tenderness Lumbar erector spine, Quad lumb and Sub-occipital R>>L  A: static palpation demonstrates intersegmental asymmetry   R: motion palpation notes restricted motion  T: hypertonicity at: Levator scapulae and Sub-occipital R>>L    Segmental spinal dysfunction/restrictions found at:  C2 , C7 , T5 , T9 , L5 , Sacrum  and PSIS Right       Assessment:    Diagnoses:      1. Cervical segment dysfunction    2. Cervicalgia    3. Somatic dysfunction of lumbar region    4. Acute right-sided low back pain without sciatica    5. Somatic dysfunction of sacral region    6. Concussion without loss of consciousness, subsequent encounter        Patient's condition:  Patient responding well to therapy    Treatment effectiveness:  Post manipulation there is better intersegmental movement and Patient claims to feel looser post manipulation      Procedures:  CMT:  84887 Chiropractic manipulative treatment 3-4 regions performed   OCC: R OCC Activator diversified  Thoracic: Diversified, T1, T5,  T9, Prone  Lumbar: Diversified, L5, Side posture  Pelvis: Drop Table, Sacrum , PSIS Right , Prone    Modalities:  ACP: Nimisha points of the cervical spine and OCC, lauren points of the upper shoulders    Therapeutic procedures:  None     Response to Treatment  Reduction in symptoms as reported by patient    Prognosis: Excellent    Progress towards Goals: Patient is making progress towards the goal.  Goals:  SLEEPING: the patient specific goal is to attain his pre-injury status of 8 hours comfortably-falling asleep  SITTING: the patient specific goal is to attain pre-injury status of  8 hours comfortably         Recommendations:    Instructions:  use lumbar support when seated     Follow-up:    Return to care in 1-2 week with ACP

## 2019-05-29 ENCOUNTER — THERAPY VISIT (OUTPATIENT)
Dept: CHIROPRACTIC MEDICINE | Facility: CLINIC | Age: 24
End: 2019-05-29
Payer: COMMERCIAL

## 2019-05-29 DIAGNOSIS — M99.01 CERVICAL SEGMENT DYSFUNCTION: Primary | ICD-10-CM

## 2019-05-29 DIAGNOSIS — M54.2 CERVICALGIA: ICD-10-CM

## 2019-05-29 DIAGNOSIS — M99.04 SOMATIC DYSFUNCTION OF SACRAL REGION: ICD-10-CM

## 2019-05-29 DIAGNOSIS — M54.50 ACUTE RIGHT-SIDED LOW BACK PAIN WITHOUT SCIATICA: ICD-10-CM

## 2019-05-29 DIAGNOSIS — S06.0X0D CONCUSSION WITHOUT LOSS OF CONSCIOUSNESS, SUBSEQUENT ENCOUNTER: ICD-10-CM

## 2019-05-29 DIAGNOSIS — M99.03 SOMATIC DYSFUNCTION OF LUMBAR REGION: ICD-10-CM

## 2019-05-29 PROCEDURE — 98940 CHIROPRACT MANJ 1-2 REGIONS: CPT | Mod: AT | Performed by: CHIROPRACTOR

## 2019-05-29 PROCEDURE — 97810 ACUP 1/> WO ESTIM 1ST 15 MIN: CPT | Mod: GA | Performed by: CHIROPRACTOR

## 2019-05-29 NOTE — PROGRESS NOTES
Visit #:  5    Subjective:  iTffany Solano is a 23 year old female who is seen in f/u up for:        Cervical segment dysfunction  Cervicalgia  Somatic dysfunction of lumbar region  Acute right-sided low back pain without sciatica  Somatic dysfunction of sacral region  Concussion without loss of consciousness, subsequent encounter.     Since last visit,  Tiffany Solano reports:    Area of chief complaint:  Cervical, Thoracic and Lumbar :  Symptoms are graded at . The quality is described as stiff, achey, dull.  Motion has increased, but is still not normal. The pt reports 90% improvement. She experiences HA sx once in the morning 1-2 times per week. She denies dizziness sx. She has been cleared from the concussion. The pt denies weakness or other unusual sx.     Since last visit the patient feels that they are 90% percent  improved from last visit.       Objective:  The following was observed:    P: palpatory tenderness Lumbar erector spine, Quad lumb and Sub-occipital R>>L  A: static palpation demonstrates intersegmental asymmetry   R: motion palpation notes restricted motion  T: hypertonicity at: Levator scapulae and Sub-occipital R>>L    Segmental spinal dysfunction/restrictions found at:  C2 , C7 , T5 , T9 , L5 , Sacrum  and PSIS Right       Assessment:    Diagnoses:      1. Cervical segment dysfunction    2. Cervicalgia    3. Somatic dysfunction of lumbar region    4. Acute right-sided low back pain without sciatica    5. Somatic dysfunction of sacral region    6. Concussion without loss of consciousness, subsequent encounter        Patient's condition:  Patient responding well to therapy    Treatment effectiveness:  Post manipulation there is better intersegmental movement and Patient claims to feel looser post manipulation      Procedures:  CMT:  17327 Chiropractic manipulative treatment 3-4 regions performed   OCC: R OCC Activator diversified  Thoracic: Diversified, T1, T5, T9, Prone  Lumbar:  Diversified, L5, Side posture  Pelvis: Drop Table, Sacrum , PSIS Right , Prone    Modalities:  ACP: [place] points of the cervical spine and OCC, lauren points of the upper shoulders    Therapeutic procedures:  None     Response to Treatment  Reduction in symptoms as reported by patient    Prognosis: Excellent    Progress towards Goals: Patient is making progress towards the goal.  Goals:  SLEEPING: the patient specific goal is to attain his pre-injury status of 8 hours comfortably-falling asleep  SITTING: the patient specific goal is to attain pre-injury status of  8 hours comfortably         Recommendations:    Instructions:  use lumbar support when seated     Follow-up:    Return to care in 3-4  week with ACP

## 2019-11-25 ENCOUNTER — APPOINTMENT (OUTPATIENT)
Age: 24
Setting detail: DERMATOLOGY
End: 2019-11-25

## 2019-11-25 DIAGNOSIS — L21.8 OTHER SEBORRHEIC DERMATITIS: ICD-10-CM

## 2019-11-25 DIAGNOSIS — D18.0 HEMANGIOMA: ICD-10-CM

## 2019-11-25 DIAGNOSIS — D22 MELANOCYTIC NEVI: ICD-10-CM

## 2019-11-25 DIAGNOSIS — L70.0 ACNE VULGARIS: ICD-10-CM

## 2019-11-25 PROBLEM — D23.72 OTHER BENIGN NEOPLASM OF SKIN OF LEFT LOWER LIMB, INCLUDING HIP: Status: ACTIVE | Noted: 2019-11-25

## 2019-11-25 PROBLEM — D18.01 HEMANGIOMA OF SKIN AND SUBCUTANEOUS TISSUE: Status: ACTIVE | Noted: 2019-11-25

## 2019-11-25 PROBLEM — D22.39 MELANOCYTIC NEVI OF OTHER PARTS OF FACE: Status: ACTIVE | Noted: 2019-11-25

## 2019-11-25 PROCEDURE — OTHER PRESCRIPTION: OTHER

## 2019-11-25 PROCEDURE — 99203 OFFICE O/P NEW LOW 30 MIN: CPT

## 2019-11-25 PROCEDURE — OTHER COUNSELING: OTHER

## 2019-11-25 RX ORDER — KETOCONAZOLE 20.5 MG/ML
SHAMPOO, SUSPENSION TOPICAL
Qty: 1 | Refills: 2 | Status: ERX | COMMUNITY
Start: 2019-11-25

## 2019-11-25 ASSESSMENT — LOCATION ZONE DERM
LOCATION ZONE: TRUNK
LOCATION ZONE: SCALP
LOCATION ZONE: FACE

## 2019-11-25 ASSESSMENT — LOCATION DETAILED DESCRIPTION DERM
LOCATION DETAILED: LEFT MEDIAL FRONTAL SCALP
LOCATION DETAILED: UPPER STERNUM
LOCATION DETAILED: LEFT INFERIOR CENTRAL MALAR CHEEK
LOCATION DETAILED: RIGHT MEDIAL FOREHEAD

## 2019-11-25 ASSESSMENT — LOCATION SIMPLE DESCRIPTION DERM
LOCATION SIMPLE: RIGHT FOREHEAD
LOCATION SIMPLE: LEFT SCALP
LOCATION SIMPLE: CHEST
LOCATION SIMPLE: LEFT CHEEK

## 2019-11-25 NOTE — PROCEDURE: COUNSELING
Erythromycin Counseling:  I discussed with the patient the risks of erythromycin including but not limited to GI upset, allergic reaction, drug rash, diarrhea, increase in liver enzymes, and yeast infections.
Azithromycin Counseling:  I discussed with the patient the risks of azithromycin including but not limited to GI upset, allergic reaction, drug rash, diarrhea, and yeast infections.
Doxycycline Counseling:  Patient counseled regarding possible photosensitivity and increased risk for sunburn.  Patient instructed to avoid sunlight, if possible.  When exposed to sunlight, patients should wear protective clothing, sunglasses, and sunscreen.  The patient was instructed to call the office immediately if the following severe adverse effects occur:  hearing changes, easy bruising/bleeding, severe headache, or vision changes.  The patient verbalized understanding of the proper use and possible adverse effects of doxycycline.  All of the patient's questions and concerns were addressed.
Detail Level: Zone
Tetracycline Pregnancy And Lactation Text: This medication is Pregnancy Category D and not consider safe during pregnancy. It is also excreted in breast milk.
Spironolactone Counseling: Patient advised regarding risks of diarrhea, abdominal pain, hyperkalemia, birth defects (for female patients), liver toxicity and renal toxicity. The patient may need blood work to monitor liver and kidney function and potassium levels while on therapy. The patient verbalized understanding of the proper use and possible adverse effects of spironolactone.  All of the patient's questions and concerns were addressed.
Bactrim Pregnancy And Lactation Text: This medication is Pregnancy Category D and is known to cause fetal risk.  It is also excreted in breast milk.
Birth Control Pills Counseling: Birth Control Pill Counseling: I discussed with the patient the potential side effects of OCPs including but not limited to increased risk of stroke, heart attack, thrombophlebitis, deep venous thrombosis, hepatic adenomas, breast changes, GI upset, headaches, and depression.  The patient verbalized understanding of the proper use and possible adverse effects of OCPs. All of the patient's questions and concerns were addressed.
Tetracycline Counseling: Patient counseled regarding possible photosensitivity and increased risk for sunburn.  Patient instructed to avoid sunlight, if possible.  When exposed to sunlight, patients should wear protective clothing, sunglasses, and sunscreen.  The patient was instructed to call the office immediately if the following severe adverse effects occur:  hearing changes, easy bruising/bleeding, severe headache, or vision changes.  The patient verbalized understanding of the proper use and possible adverse effects of tetracycline.  All of the patient's questions and concerns were addressed. Patient understands to avoid pregnancy while on therapy due to potential birth defects.
Topical Retinoid counseling:  Patient advised to apply a pea-sized amount only at bedtime and wait 30 minutes after washing their face before applying.  If too drying, patient may add a non-comedogenic moisturizer. The patient verbalized understanding of the proper use and possible adverse effects of retinoids.  All of the patient's questions and concerns were addressed.
High Dose Vitamin A Pregnancy And Lactation Text: High dose vitamin A therapy is contraindicated during pregnancy and breast feeding.
Detail Level: Simple
Isotretinoin Counseling: Patient should get monthly blood tests, not donate blood, not drive at night if vision affected, not share medication, and not undergo elective surgery for 6 months after tx completed. Side effects reviewed, pt to contact office should one occur.
Benzoyl Peroxide Pregnancy And Lactation Text: This medication is Pregnancy Category C. It is unknown if benzoyl peroxide is excreted in breast milk.
High Dose Vitamin A Counseling: Side effects reviewed, pt to contact office should one occur.
Topical Clindamycin Pregnancy And Lactation Text: This medication is Pregnancy Category B and is considered safe during pregnancy. It is unknown if it is excreted in breast milk.
Tazorac Pregnancy And Lactation Text: This medication is not safe during pregnancy. It is unknown if this medication is excreted in breast milk.
Use Enhanced Medication Counseling?: No
Topical Sulfur Applications Pregnancy And Lactation Text: This medication is Pregnancy Category C and has an unknown safety profile during pregnancy. It is unknown if this topical medication is excreted in breast milk.
Isotretinoin Pregnancy And Lactation Text: This medication is Pregnancy Category X and is considered extremely dangerous during pregnancy. It is unknown if it is excreted in breast milk.
Topical Sulfur Applications Counseling: Topical Sulfur Counseling: Patient counseled that this medication may cause skin irritation or allergic reactions.  In the event of skin irritation, the patient was advised to reduce the amount of the drug applied or use it less frequently.   The patient verbalized understanding of the proper use and possible adverse effects of topical sulfur application.  All of the patient's questions and concerns were addressed.
Topical Retinoid Pregnancy And Lactation Text: This medication is Pregnancy Category C. It is unknown if this medication is excreted in breast milk.
Dapsone Pregnancy And Lactation Text: This medication is Pregnancy Category C and is not considered safe during pregnancy or breast feeding.
Topical Clindamycin Counseling: Patient counseled that this medication may cause skin irritation or allergic reactions.  In the event of skin irritation, the patient was advised to reduce the amount of the drug applied or use it less frequently.   The patient verbalized understanding of the proper use and possible adverse effects of clindamycin.  All of the patient's questions and concerns were addressed.
Doxycycline Pregnancy And Lactation Text: This medication is Pregnancy Category D and not consider safe during pregnancy. It is also excreted in breast milk but is considered safe for shorter treatment courses.
Azithromycin Pregnancy And Lactation Text: This medication is considered safe during pregnancy and is also secreted in breast milk.
Birth Control Pills Pregnancy And Lactation Text: This medication should be avoided if pregnant and for the first 30 days post-partum.
Benzoyl Peroxide Counseling: Patient counseled that medicine may cause skin irritation and bleach clothing.  In the event of skin irritation, the patient was advised to reduce the amount of the drug applied or use it less frequently.   The patient verbalized understanding of the proper use and possible adverse effects of benzoyl peroxide.  All of the patient's questions and concerns were addressed.
Spironolactone Pregnancy And Lactation Text: This medication can cause feminization of the male fetus and should be avoided during pregnancy. The active metabolite is also found in breast milk.
Minocycline Counseling: Patient advised regarding possible photosensitivity and discoloration of the teeth, skin, lips, tongue and gums.  Patient instructed to avoid sunlight, if possible.  When exposed to sunlight, patients should wear protective clothing, sunglasses, and sunscreen.  The patient was instructed to call the office immediately if the following severe adverse effects occur:  hearing changes, easy bruising/bleeding, severe headache, or vision changes.  The patient verbalized understanding of the proper use and possible adverse effects of minocycline.  All of the patient's questions and concerns were addressed.
Erythromycin Pregnancy And Lactation Text: This medication is Pregnancy Category B and is considered safe during pregnancy. It is also excreted in breast milk.
Dapsone Counseling: I discussed with the patient the risks of dapsone including but not limited to hemolytic anemia, agranulocytosis, rashes, methemoglobinemia, kidney failure, peripheral neuropathy, headaches, GI upset, and liver toxicity.  Patients who start dapsone require monitoring including baseline LFTs and weekly CBCs for the first month, then every month thereafter.  The patient verbalized understanding of the proper use and possible adverse effects of dapsone.  All of the patient's questions and concerns were addressed.
Tazorac Counseling:  Patient advised that medication is irritating and drying.  Patient may need to apply sparingly and wash off after an hour before eventually leaving it on overnight.  The patient verbalized understanding of the proper use and possible adverse effects of tazorac.  All of the patient's questions and concerns were addressed.
Bactrim Counseling:  I discussed with the patient the risks of sulfa antibiotics including but not limited to GI upset, allergic reaction, drug rash, diarrhea, dizziness, photosensitivity, and yeast infections.  Rarely, more serious reactions can occur including but not limited to aplastic anemia, agranulocytosis, methemoglobinemia, blood dyscrasias, liver or kidney failure, lung infiltrates or desquamative/blistering drug rashes.

## 2019-11-25 NOTE — HPI: PIMPLES (ACNE)
What Type Of Note Output Would You Prefer (Optional)?: Bullet Format
How Severe Is Your Acne?: mild
Is This A New Presentation, Or A Follow-Up?: Acne
Additional Comments (Use Complete Sentences): Patient also experiences a dry scalp in the winter.

## 2019-12-20 ENCOUNTER — APPOINTMENT (OUTPATIENT)
Age: 24
Setting detail: DERMATOLOGY
End: 2019-12-20

## 2019-12-20 DIAGNOSIS — L70.0 ACNE VULGARIS: ICD-10-CM

## 2019-12-20 DIAGNOSIS — L21.8 OTHER SEBORRHEIC DERMATITIS: ICD-10-CM

## 2019-12-20 PROCEDURE — OTHER COUNSELING: OTHER

## 2019-12-20 PROCEDURE — 99213 OFFICE O/P EST LOW 20 MIN: CPT

## 2019-12-20 ASSESSMENT — LOCATION SIMPLE DESCRIPTION DERM
LOCATION SIMPLE: POSTERIOR SCALP
LOCATION SIMPLE: ANTERIOR SCALP

## 2019-12-20 ASSESSMENT — LOCATION DETAILED DESCRIPTION DERM
LOCATION DETAILED: MID-FRONTAL SCALP
LOCATION DETAILED: RIGHT INFERIOR POSTAURICULAR SKIN
LOCATION DETAILED: LEFT INFERIOR POSTAURICULAR SKIN

## 2019-12-20 ASSESSMENT — SEVERITY ASSESSMENT: HOW SEVERE IS THIS PATIENT'S CONDITION?: ALMOST CLEAR

## 2019-12-20 ASSESSMENT — LOCATION ZONE DERM: LOCATION ZONE: SCALP

## 2019-12-20 NOTE — PROCEDURE: COUNSELING
Birth Control Pills Counseling: Birth Control Pill Counseling: I discussed with the patient the potential side effects of OCPs including but not limited to increased risk of stroke, heart attack, thrombophlebitis, deep venous thrombosis, hepatic adenomas, breast changes, GI upset, headaches, and depression.  The patient verbalized understanding of the proper use and possible adverse effects of OCPs. All of the patient's questions and concerns were addressed.
Erythromycin Pregnancy And Lactation Text: This medication is Pregnancy Category B and is considered safe during pregnancy. It is also excreted in breast milk.
Topical Clindamycin Counseling: Patient counseled that this medication may cause skin irritation or allergic reactions.  In the event of skin irritation, the patient was advised to reduce the amount of the drug applied or use it less frequently.   The patient verbalized understanding of the proper use and possible adverse effects of clindamycin.  All of the patient's questions and concerns were addressed.
Azithromycin Pregnancy And Lactation Text: This medication is considered safe during pregnancy and is also secreted in breast milk.
Patient Specific Counseling (Will Not Stick From Patient To Patient): She is no longer taking her Cefuroxime. She will continue Spironolactone  25 mg BID and has refills.  She is going to restart OCP  that have been helpful in the past as well. We will see her back in 3 months and if continuing to flare, will consider increase her spironolactone dose.
Tazorac Counseling:  Patient advised that medication is irritating and drying.  Patient may need to apply sparingly and wash off after an hour before eventually leaving it on overnight.  The patient verbalized understanding of the proper use and possible adverse effects of tazorac.  All of the patient's questions and concerns were addressed.
Isotretinoin Counseling: Patient should get monthly blood tests, not donate blood, not drive at night if vision affected, not share medication, and not undergo elective surgery for 6 months after tx completed. Side effects reviewed, pt to contact office should one occur.
Detail Level: Generalized
Minocycline Pregnancy And Lactation Text: This medication is Pregnancy Category D and not consider safe during pregnancy. It is also excreted in breast milk.
Spironolactone Counseling: Patient advised regarding risks of diarrhea, abdominal pain, hyperkalemia, birth defects (for female patients), liver toxicity and renal toxicity. The patient may need blood work to monitor liver and kidney function and potassium levels while on therapy. The patient verbalized understanding of the proper use and possible adverse effects of spironolactone.  All of the patient's questions and concerns were addressed.
Dapsone Pregnancy And Lactation Text: This medication is Pregnancy Category C and is not considered safe during pregnancy or breast feeding.
Patient Specific Counseling (Will Not Stick From Patient To Patient): The Keto shampoo is working well for her and she would like to continue this.
Azithromycin Counseling:  I discussed with the patient the risks of azithromycin including but not limited to GI upset, allergic reaction, drug rash, diarrhea, and yeast infections.
Patient Specific Counseling (Will Not Stick From Patient To Patient): She will continue her Ketoconazole shampoo, no refills needed
Erythromycin Counseling:  I discussed with the patient the risks of erythromycin including but not limited to GI upset, allergic reaction, drug rash, diarrhea, increase in liver enzymes, and yeast infections.
Tazorac Pregnancy And Lactation Text: This medication is not safe during pregnancy. It is unknown if this medication is excreted in breast milk.
Benzoyl Peroxide Pregnancy And Lactation Text: This medication is Pregnancy Category C. It is unknown if benzoyl peroxide is excreted in breast milk.
Topical Clindamycin Pregnancy And Lactation Text: This medication is Pregnancy Category B and is considered safe during pregnancy. It is unknown if it is excreted in breast milk.
Detail Level: Detailed
High Dose Vitamin A Pregnancy And Lactation Text: High dose vitamin A therapy is contraindicated during pregnancy and breast feeding.
Topical Retinoid counseling:  Patient advised to apply a pea-sized amount only at bedtime and wait 30 minutes after washing their face before applying.  If too drying, patient may add a non-comedogenic moisturizer. The patient verbalized understanding of the proper use and possible adverse effects of retinoids.  All of the patient's questions and concerns were addressed.
Isotretinoin Pregnancy And Lactation Text: This medication is Pregnancy Category X and is considered extremely dangerous during pregnancy. It is unknown if it is excreted in breast milk.
Use Enhanced Medication Counseling?: No
Topical Sulfur Applications Counseling: Topical Sulfur Counseling: Patient counseled that this medication may cause skin irritation or allergic reactions.  In the event of skin irritation, the patient was advised to reduce the amount of the drug applied or use it less frequently.   The patient verbalized understanding of the proper use and possible adverse effects of topical sulfur application.  All of the patient's questions and concerns were addressed.
Bactrim Counseling:  I discussed with the patient the risks of sulfa antibiotics including but not limited to GI upset, allergic reaction, drug rash, diarrhea, dizziness, photosensitivity, and yeast infections.  Rarely, more serious reactions can occur including but not limited to aplastic anemia, agranulocytosis, methemoglobinemia, blood dyscrasias, liver or kidney failure, lung infiltrates or desquamative/blistering drug rashes.
Birth Control Pills Pregnancy And Lactation Text: This medication should be avoided if pregnant and for the first 30 days post-partum.
Spironolactone Pregnancy And Lactation Text: This medication can cause feminization of the male fetus and should be avoided during pregnancy. The active metabolite is also found in breast milk.
Topical Sulfur Applications Pregnancy And Lactation Text: This medication is Pregnancy Category C and has an unknown safety profile during pregnancy. It is unknown if this topical medication is excreted in breast milk.
Tetracycline Counseling: Patient counseled regarding possible photosensitivity and increased risk for sunburn.  Patient instructed to avoid sunlight, if possible.  When exposed to sunlight, patients should wear protective clothing, sunglasses, and sunscreen.  The patient was instructed to call the office immediately if the following severe adverse effects occur:  hearing changes, easy bruising/bleeding, severe headache, or vision changes.  The patient verbalized understanding of the proper use and possible adverse effects of tetracycline.  All of the patient's questions and concerns were addressed. Patient understands to avoid pregnancy while on therapy due to potential birth defects.
Bactrim Pregnancy And Lactation Text: This medication is Pregnancy Category D and is known to cause fetal risk.  It is also excreted in breast milk.
Doxycycline Pregnancy And Lactation Text: This medication is Pregnancy Category D and not consider safe during pregnancy. It is also excreted in breast milk but is considered safe for shorter treatment courses.
Dapsone Counseling: I discussed with the patient the risks of dapsone including but not limited to hemolytic anemia, agranulocytosis, rashes, methemoglobinemia, kidney failure, peripheral neuropathy, headaches, GI upset, and liver toxicity.  Patients who start dapsone require monitoring including baseline LFTs and weekly CBCs for the first month, then every month thereafter.  The patient verbalized understanding of the proper use and possible adverse effects of dapsone.  All of the patient's questions and concerns were addressed.
Minocycline Counseling: Patient advised regarding possible photosensitivity and discoloration of the teeth, skin, lips, tongue and gums.  Patient instructed to avoid sunlight, if possible.  When exposed to sunlight, patients should wear protective clothing, sunglasses, and sunscreen.  The patient was instructed to call the office immediately if the following severe adverse effects occur:  hearing changes, easy bruising/bleeding, severe headache, or vision changes.  The patient verbalized understanding of the proper use and possible adverse effects of minocycline.  All of the patient's questions and concerns were addressed.
Doxycycline Counseling:  Patient counseled regarding possible photosensitivity and increased risk for sunburn.  Patient instructed to avoid sunlight, if possible.  When exposed to sunlight, patients should wear protective clothing, sunglasses, and sunscreen.  The patient was instructed to call the office immediately if the following severe adverse effects occur:  hearing changes, easy bruising/bleeding, severe headache, or vision changes.  The patient verbalized understanding of the proper use and possible adverse effects of doxycycline.  All of the patient's questions and concerns were addressed.
Benzoyl Peroxide Counseling: Patient counseled that medicine may cause skin irritation and bleach clothing.  In the event of skin irritation, the patient was advised to reduce the amount of the drug applied or use it less frequently.   The patient verbalized understanding of the proper use and possible adverse effects of benzoyl peroxide.  All of the patient's questions and concerns were addressed.
Topical Retinoid Pregnancy And Lactation Text: This medication is Pregnancy Category C. It is unknown if this medication is excreted in breast milk.
High Dose Vitamin A Counseling: Side effects reviewed, pt to contact office should one occur.
Detail Level: Zone

## 2021-04-25 ENCOUNTER — HEALTH MAINTENANCE LETTER (OUTPATIENT)
Age: 26
End: 2021-04-25

## 2021-10-10 ENCOUNTER — HEALTH MAINTENANCE LETTER (OUTPATIENT)
Age: 26
End: 2021-10-10

## 2022-05-21 ENCOUNTER — HEALTH MAINTENANCE LETTER (OUTPATIENT)
Age: 27
End: 2022-05-21

## 2022-09-18 ENCOUNTER — HEALTH MAINTENANCE LETTER (OUTPATIENT)
Age: 27
End: 2022-09-18

## 2023-06-04 ENCOUNTER — HEALTH MAINTENANCE LETTER (OUTPATIENT)
Age: 28
End: 2023-06-04